# Patient Record
Sex: FEMALE | Race: WHITE | NOT HISPANIC OR LATINO | Employment: FULL TIME | URBAN - METROPOLITAN AREA
[De-identification: names, ages, dates, MRNs, and addresses within clinical notes are randomized per-mention and may not be internally consistent; named-entity substitution may affect disease eponyms.]

---

## 2023-09-28 PROBLEM — H01.131 ECZEMATOUS DERMATITIS OF RIGHT UPPER EYELID: Status: ACTIVE | Noted: 2023-07-31

## 2023-09-28 PROBLEM — H69.93 DYSFUNCTION OF BOTH EUSTACHIAN TUBES: Status: ACTIVE | Noted: 2023-09-28

## 2023-09-28 PROBLEM — R92.8 ABNORMAL MAMMOGRAM: Status: ACTIVE | Noted: 2023-09-28

## 2023-09-28 PROBLEM — M77.8 WRIST TENDONITIS: Status: ACTIVE | Noted: 2023-09-28

## 2023-09-28 PROBLEM — R30.0 DYSURIA: Status: ACTIVE | Noted: 2023-09-28

## 2023-09-28 PROBLEM — H01.134 ECZEMATOUS DERMATITIS OF LEFT UPPER EYELID: Status: ACTIVE | Noted: 2023-07-31

## 2023-09-28 PROBLEM — R20.2 PARESTHESIAS: Status: ACTIVE | Noted: 2023-09-28

## 2023-09-28 PROBLEM — L30.9 DERMATITIS, UNSPECIFIED: Status: ACTIVE | Noted: 2023-07-31

## 2023-09-28 PROBLEM — R87.613 HIGH GRADE SQUAMOUS INTRAEPITHELIAL LESION (HGSIL) ON CYTOLOGIC SMEAR OF CERVIX: Status: ACTIVE | Noted: 2023-09-28

## 2023-09-28 PROBLEM — C44.511 BASAL CELL CARCINOMA OF SKIN OF BREAST: Status: ACTIVE | Noted: 2023-07-31

## 2023-09-28 PROBLEM — L57.0 ACTINIC KERATOSIS: Status: ACTIVE | Noted: 2023-07-31

## 2023-09-28 PROBLEM — L70.0 ACNE VULGARIS: Status: ACTIVE | Noted: 2023-07-31

## 2023-09-28 PROBLEM — L71.9 ROSACEA, UNSPECIFIED: Status: ACTIVE | Noted: 2023-07-31

## 2023-09-28 RX ORDER — DAPSONE 75 MG/G
1 GEL TOPICAL
COMMUNITY
Start: 2020-03-05

## 2023-09-28 RX ORDER — IODOQUINOL 1% - HYDROCORTISONE ACETATE 2% - ALOE POLYSACCHARIDES 1% 10; 20; 10 MG/G; MG/G; MG/G
1 GEL TOPICAL
COMMUNITY
Start: 2019-06-04

## 2023-09-28 RX ORDER — NITROFURANTOIN 25; 75 MG/1; MG/1
100 CAPSULE ORAL EVERY 12 HOURS
COMMUNITY
Start: 2022-06-11

## 2023-09-28 RX ORDER — IVERMECTIN 10 MG/G
1 CREAM TOPICAL
COMMUNITY
Start: 2019-06-04

## 2023-09-28 RX ORDER — SPIRONOLACTONE AND HYDROCHLOROTHIAZIDE 25; 25 MG/1; MG/1
1 TABLET ORAL
COMMUNITY
Start: 2017-04-04

## 2023-09-28 RX ORDER — HYDROCORTISONE BUTYRATE 1 MG/ML
1 LOTION TOPICAL
COMMUNITY
Start: 2017-04-04

## 2023-09-28 RX ORDER — MINOCYCLINE HYDROCHLORIDE 90 MG/1
90 TABLET, FILM COATED, EXTENDED RELEASE ORAL
COMMUNITY
Start: 2017-04-07

## 2023-09-28 RX ORDER — CEFUROXIME AXETIL 250 MG/1
1 TABLET ORAL
COMMUNITY
Start: 2017-07-27

## 2023-09-28 RX ORDER — CLINDAMYCIN PHOSPHATE AND TRETINOIN GEL 1.2%/0.025% 10; .25 MG/G; MG/G
1 GEL TOPICAL
COMMUNITY
Start: 2017-05-02

## 2023-10-03 ENCOUNTER — OFFICE VISIT (OUTPATIENT)
Dept: DERMATOLOGY | Facility: CLINIC | Age: 63
End: 2023-10-03
Payer: COMMERCIAL

## 2023-10-03 DIAGNOSIS — Z48.02 VISIT FOR SUTURE REMOVAL: ICD-10-CM

## 2023-10-03 PROCEDURE — 99024 POSTOP FOLLOW-UP VISIT: CPT | Performed by: SPECIALIST

## 2023-10-03 NOTE — PROGRESS NOTES
Subjective   HPI: Damaris Malhotra is a 62 y.o. female is here for evaluation and treatment of sterile surgical site and suture removal..     ROS: No other skin or systemic complaints other than what is documented elsewhere in the note.    Skin Cancer History  No skin cancer on file.    ALLERGIES: Patient has no known allergies.    SOCIAL:  has no history on file for tobacco use, alcohol use, and drug use.    Objective       Assessment/Plan        FOLLOW UP: Patient should return in 4 weeks for electrodesiccation and curettage of residual basal cell carcinoma of the superficial variant.    The patient was encouraged to contact me with any further questions or concerns.  Karyna Fotser PA-C  10/3/2023

## 2023-10-31 ENCOUNTER — OFFICE VISIT (OUTPATIENT)
Dept: DERMATOLOGY | Facility: CLINIC | Age: 63
End: 2023-10-31
Payer: COMMERCIAL

## 2023-10-31 DIAGNOSIS — L21.9 SEBORRHEIC DERMATITIS: ICD-10-CM

## 2023-10-31 DIAGNOSIS — C44.511 BASAL CELL CARCINOMA (BCC) OF SKIN OF RIGHT BREAST: ICD-10-CM

## 2023-10-31 PROCEDURE — 17264 DSTRJ MAL LES T/A/L 3.1-4.0: CPT | Performed by: SPECIALIST

## 2023-10-31 PROCEDURE — 99213 OFFICE O/P EST LOW 20 MIN: CPT | Performed by: SPECIALIST

## 2023-10-31 NOTE — PROGRESS NOTES
Subjective     Damaris Malhotra is a 63 y.o. female who presents for the following: ED&C (Electrodesiccation & Curettage) (Right Lateral Breast  /Derm Report : [id number]).     Review of Systems:  No other skin or systemic complaints other than what is documented elsewhere in the note.    The following portions of the chart were reviewed this encounter and updated as appropriate:          Skin Cancer History  No skin cancer on file.      Specialty Problems          Dermatology Problems    Acne vulgaris    Actinic keratosis    Dermatitis, unspecified    Eczematous dermatitis of right upper eyelid    Rosacea, unspecified        Objective   Well appearing patient in no apparent distress; mood and affect are within normal limits.    A focused skin examination was performed. All findings within normal limits unless otherwise noted below.    Assessment/Plan   1. Basal cell carcinoma (BCC) of skin of right breast  Right Lateral Breast    2. Seborrheic dermatitis  Head - Anterior (Face)

## 2023-10-31 NOTE — PROGRESS NOTES
"Subjective   HPI: Damaris Malhotra is a 63 y.o. female is here for evaluation and treatment of patient is here for electrodesiccation curettage of residual basal cell carcinoma in situ in a previously excised area.  There appears to be some scaling along the surgical area.  Patient also complains of \"dry patches\" involving her face.    ROS: No other skin or systemic complaints other than what is documented elsewhere in the note.    ALLERGIES: Patient has no known allergies.    SOCIAL:  has no history on file for tobacco use, alcohol use, and drug use.    Objective     Description: Erythema with some fine scale abutting the surgical scar.  Patches of oily flaky dermatitis involving her glabella.    Assessment/Plan   1. Basal cell carcinoma (BCC) of skin of right breast  Right Lateral Breast    2. Seborrheic dermatitis  Head - Anterior (Face)         FOLLOW UP: 3 months.    The patient was encouraged to contact me with any further questions or concerns.  Maldonado Hardin MD  10/31/2023    "

## 2023-10-31 NOTE — PROGRESS NOTES
Subjective     Damaris Malhotra is a 63 y.o. female who presents for the following: ED&C (Electrodesiccation & Curettage) (Right Lateral Breast  /Derm Report : [id number]).     Review of Systems:  No other skin or systemic complaints other than what is documented elsewhere in the note.    The following portions of the chart were reviewed this encounter and updated as appropriate:          Skin Cancer History  No skin cancer on file.      Specialty Problems          Dermatology Problems    Acne vulgaris    Actinic keratosis    Dermatitis, unspecified    Eczematous dermatitis of right upper eyelid    Rosacea, unspecified        Objective   Well appearing patient in no apparent distress; mood and affect are within normal limits.    A focused skin examination was performed. All findings within normal limits unless otherwise noted below.    Assessment/Plan